# Patient Record
Sex: FEMALE | Race: BLACK OR AFRICAN AMERICAN | NOT HISPANIC OR LATINO | Employment: FULL TIME | URBAN - METROPOLITAN AREA
[De-identification: names, ages, dates, MRNs, and addresses within clinical notes are randomized per-mention and may not be internally consistent; named-entity substitution may affect disease eponyms.]

---

## 2018-05-31 ENCOUNTER — HOSPITAL ENCOUNTER (EMERGENCY)
Facility: HOSPITAL | Age: 23
Discharge: HOME | End: 2018-05-31
Attending: EMERGENCY MEDICINE | Admitting: EMERGENCY MEDICINE

## 2018-05-31 VITALS
DIASTOLIC BLOOD PRESSURE: 75 MMHG | SYSTOLIC BLOOD PRESSURE: 116 MMHG | TEMPERATURE: 97.8 F | OXYGEN SATURATION: 100 % | HEART RATE: 67 BPM | RESPIRATION RATE: 16 BRPM | WEIGHT: 173 LBS

## 2018-05-31 DIAGNOSIS — L73.2 HIDRADENITIS SUPPURATIVA: Primary | ICD-10-CM

## 2018-05-31 PROCEDURE — 90471 IMMUNIZATION ADMIN: CPT

## 2018-05-31 PROCEDURE — 99283 EMERGENCY DEPT VISIT LOW MDM: CPT

## 2018-05-31 PROCEDURE — 96372 THER/PROPH/DIAG INJ SC/IM: CPT

## 2018-05-31 PROCEDURE — 90715 TDAP VACCINE 7 YRS/> IM: CPT | Performed by: EMERGENCY MEDICINE

## 2018-05-31 RX ORDER — LIDOCAINE HYDROCHLORIDE AND EPINEPHRINE 10; 10 MG/ML; UG/ML
20 INJECTION, SOLUTION INFILTRATION; PERINEURAL ONCE
Status: COMPLETED | OUTPATIENT
Start: 2018-05-31 | End: 2018-05-31

## 2018-05-31 RX ORDER — SULFAMETHOXAZOLE AND TRIMETHOPRIM 800; 160 MG/1; MG/1
1 TABLET ORAL 2 TIMES DAILY
Qty: 14 TABLET | Refills: 0 | Status: SHIPPED | OUTPATIENT
Start: 2018-05-31 | End: 2018-06-07

## 2018-05-31 RX ORDER — OXYCODONE HYDROCHLORIDE AND ACETAMINOPHEN 5; 325 MG/1; MG/1
1-2 TABLET ORAL EVERY 6 HOURS PRN
Qty: 20 TABLET | Refills: 0 | Status: SHIPPED | OUTPATIENT
Start: 2018-05-31

## 2018-05-31 RX ADMIN — TETANUS TOXOID, REDUCED DIPHTHERIA TOXOID AND ACELLULAR PERTUSSIS VACCINE, ADSORBED 0.5 ML: 5; 2.5; 8; 8; 2.5 SUSPENSION INTRAMUSCULAR at 14:20

## 2018-05-31 RX ADMIN — HYDROMORPHONE HYDROCHLORIDE 1 MG: 1 INJECTION, SOLUTION INTRAMUSCULAR; INTRAVENOUS; SUBCUTANEOUS at 14:20

## 2018-05-31 RX ADMIN — LIDOCAINE HYDROCHLORIDE,EPINEPHRINE BITARTRATE 20 ML: 10; .01 INJECTION, SOLUTION INFILTRATION; PERINEURAL at 14:20

## 2018-05-31 NOTE — ED NOTES
Per Dr Oseguera Duty pt ok to take bus home   Pt alert oriented at this time      Toro Jerez RN  05/31/18 0139

## 2018-05-31 NOTE — DISCHARGE INSTRUCTIONS
Hidradenitis Suppurativa   WHAT YOU NEED TO KNOW:   Hidradenitis suppurativa (HS) is a chronic (long-term) skin disease that causes your sweat glands or hair follicles to get clogged and inflamed  HS causes red bumps that look like pimples or small boils to develop on your skin  The cause of HS is unknown  It may run in families  Being overweight and smoking worsens signs and symptoms of HS  DISCHARGE INSTRUCTIONS:   Contact your healthcare provider if:   · Your symptoms get worse, even with treatment  · You have questions or concerns about your condition or care  Medicines: You may need any of the following:  · Antibiotics  are used to treat or prevent a bacterial infection  Antibiotics may be used long-term  Antibiotics may be given as a cream or pill  · NSAIDs , such as ibuprofen, help decrease swelling, pain, and fever  This medicine is available with or without a doctor's order  NSAIDs can cause stomach bleeding or kidney problems in certain people  If you take blood thinner medicine, always ask your healthcare provider if NSAIDs are safe for you  Always read the medicine label and follow directions  · Acetaminophen  decreases pain and fever  It is available without a doctor's order  Ask how much to take and how often to take it  Follow directions  Acetaminophen can cause liver damage if not taken correctly  · Other medicines  may be used to treat HS  These may include hormones, acne medicines, steroids, biologic therapy, and medicines that slow your immune system  · Take your medicine as directed  Contact your healthcare provider if you think your medicine is not helping or if you have side effects  Tell him of her if you are allergic to any medicine  Keep a list of the medicines, vitamins, and herbs you take  Include the amounts, and when and why you take them  Bring the list or the pill bottles to follow-up visits  Carry your medicine list with you in case of an emergency    Manage HS symptoms and decrease flare-ups:   · Apply warm, moist compresses  This may help to decrease pain  Keep the compress on your skin for 10 minutes  Sitz baths may be recommended if your genital or anal area is affected by HS  To do a sitz bath, fill a bathtub with 4 to 6 inches of warm water  You may also use a sitz bath pan that fits inside a toilet bowl  Sit in the sitz bath for 15 minutes  Do this 3 times a day, and after each bowel movement  The warm water can help decrease pain and swelling  · Wash your skin gently  Use cleansers recommended by your healthcare provider  Antibacterial soap may be helpful  · Lose weight if you are overweight  Weight loss may help to improve signs and symptoms of HS  · Do not smoke  Smoking can make it more difficult to treat HS and worsen symptoms  Ask your healthcare provider for information if you currently smoke and need help to quit  E-cigarettes or smokeless tobacco still contain nicotine  Talk to your healthcare provider before you use these products  · Do not wear tight clothing  Tight clothing rubs against your skin and causes irritation that can worsen HS  · Do not shave or use deodorant in areas of skin affected by HS  Ask your healthcare provider about safe deodorant or hair removal options  · Keep your skin cool  Overheating and sweating can cause an HS flare-up  · Ask your healthcare provider if you should make any changes to the foods you eat  Your healthcare provider may recommend that you avoid dairy foods  A dairy-free diet may help decrease your symptoms  Dairy foods include milk, cheese, yogurt, and ice cream      · Tell your healthcare provider if HS is causing you to feel depressed  Your healthcare provider may recommend counseling to help you cope with HS  Follow up with your healthcare provider as directed:  Write down your questions so you remember to ask them during your visits     © 2017 [employer] Information is for End User's use only and may not be sold, redistributed or otherwise used for commercial purposes  All illustrations and images included in CareNotes® are the copyrighted property of [employer]  or [employer]  The above information is an  only  It is not intended as medical advice for individual conditions or treatments  Talk to your doctor, nurse or pharmacist before following any medical regimen to see if it is safe and effective for you

## 2018-05-31 NOTE — ED PROVIDER NOTES
History  Chief Complaint   Patient presents with    Mass     pt c/o left axilla mass     Patient is a 38-year-old female  She presents with an abscess to her left axilla  She reports that she has had swelling there for quite some time  More recently became worse  It is painful  No fever or chills  She is not a diabetic  No relieving factors  None       History reviewed  No pertinent past medical history  History reviewed  No pertinent surgical history  History reviewed  No pertinent family history  I have reviewed and agree with the history as documented  Social History   Substance Use Topics    Smoking status: Never Smoker    Smokeless tobacco: Never Used    Alcohol use No        Review of Systems   Constitutional: Negative for chills and fever  Endocrine: Negative for polydipsia and polyuria  All other systems reviewed and are negative  Physical Exam  Physical Exam   Constitutional: She is oriented to person, place, and time  She appears well-developed and well-nourished  No distress  HENT:   Head: Normocephalic and atraumatic  Eyes: Conjunctivae are normal  Right eye exhibits no discharge  Left eye exhibits no discharge  Neck: Normal range of motion  Neck supple  Pulmonary/Chest: Effort normal  No respiratory distress  Musculoskeletal: Normal range of motion  She exhibits no deformity  There is a tender fluctuant mass to the left axilla  5 cm in diameter  Neurological: She is alert and oriented to person, place, and time  Skin: Skin is warm and dry  Psychiatric: She has a normal mood and affect  Her behavior is normal    Vitals reviewed        Vital Signs  ED Triage Vitals [05/31/18 1331]   Temperature Pulse Respirations Blood Pressure SpO2   97 8 °F (36 6 °C) 77 16 134/84 100 %      Temp Source Heart Rate Source Patient Position - Orthostatic VS BP Location FiO2 (%)   Oral Monitor Sitting Right arm --      Pain Score       No Pain           Vitals: 05/31/18 1331   BP: 134/84   Pulse: 77   Patient Position - Orthostatic VS: Sitting       Visual Acuity      ED Medications  Medications   lidocaine-epinephrine (XYLOCAINE/EPINEPHRINE) 1 %-1:100,000 injection 20 mL (not administered)   HYDROmorphone (DILAUDID) injection 1 mg (not administered)   tetanus-diphtheria-acellular pertussis (BOOSTRIX) IM injection 0 5 mL (not administered)       Diagnostic Studies  Results Reviewed     None                 No orders to display              Procedures  Incision/Drainage  Date/Time: 5/31/2018 3:20 PM  Performed by: Khalif Khalil by: Roderick Blue     Patient location:  ED  Other Assisting Provider: Yes (comment)    Consent:     Consent obtained:  Verbal  Universal protocol:     Patient identity confirmed:  Verbally with patient  Location:     Type:  Abscess    Location: Left axilla  Pre-procedure details:     Skin preparation:  Betadine  Anesthesia (see MAR for exact dosages): Anesthesia method:  Local infiltration    Local anesthetic:  Lidocaine 1% WITH epi  Procedure details:     Complexity:  Complex    Incision types:  Single straight    Scalpel blade:  11    Approach:  Open    Incision depth:  Skin    Wound management:  Probed and deloculated, irrigated with saline and extensive cleaning    Drainage:  Purulent    Drainage amount:  Copious    Wound treatment:  Wound left open    Packing materials:  None  Post-procedure details:     Patient tolerance of procedure: Tolerated well, no immediate complications           Phone Contacts  ED Phone Contact    ED Course                               [facility]  [facility] Time    Disposition  Final diagnoses:   None     ED Disposition     None      Follow-up Information    None         Patient's Medications    No medications on file     No discharge procedures on file      ED Provider  Electronically Signed by           Daniel Vera MD  05/31/18 8529